# Patient Record
Sex: FEMALE | Race: WHITE | NOT HISPANIC OR LATINO | URBAN - METROPOLITAN AREA
[De-identification: names, ages, dates, MRNs, and addresses within clinical notes are randomized per-mention and may not be internally consistent; named-entity substitution may affect disease eponyms.]

---

## 2017-10-27 ENCOUNTER — ALLSCRIPTS OFFICE VISIT (OUTPATIENT)
Dept: OTHER | Facility: OTHER | Age: 27
End: 2017-10-27

## 2017-10-27 ENCOUNTER — LAB REQUISITION (OUTPATIENT)
Dept: LAB | Facility: HOSPITAL | Age: 27
End: 2017-10-27
Payer: COMMERCIAL

## 2017-10-27 DIAGNOSIS — Z13.220 ENCOUNTER FOR SCREENING FOR LIPOID DISORDERS: ICD-10-CM

## 2017-10-27 DIAGNOSIS — Z13.1 ENCOUNTER FOR SCREENING FOR DIABETES MELLITUS: ICD-10-CM

## 2017-10-27 LAB
ANION GAP SERPL CALCULATED.3IONS-SCNC: 9 MMOL/L (ref 4–13)
BUN SERPL-MCNC: 10 MG/DL (ref 5–25)
CALCIUM SERPL-MCNC: 9.2 MG/DL (ref 8.3–10.1)
CHLORIDE SERPL-SCNC: 105 MMOL/L (ref 100–108)
CHOLEST SERPL-MCNC: 137 MG/DL (ref 50–200)
CO2 SERPL-SCNC: 26 MMOL/L (ref 21–32)
CREAT SERPL-MCNC: 0.82 MG/DL (ref 0.6–1.3)
GFR SERPL CREATININE-BSD FRML MDRD: 99 ML/MIN/1.73SQ M
GLUCOSE P FAST SERPL-MCNC: 91 MG/DL (ref 65–99)
HDLC SERPL-MCNC: 71 MG/DL (ref 40–60)
LDLC SERPL CALC-MCNC: 59 MG/DL (ref 0–100)
POTASSIUM SERPL-SCNC: 4.1 MMOL/L (ref 3.5–5.3)
SODIUM SERPL-SCNC: 140 MMOL/L (ref 136–145)
TRIGL SERPL-MCNC: 37 MG/DL

## 2017-10-27 PROCEDURE — 80061 LIPID PANEL: CPT | Performed by: PHYSICIAN ASSISTANT

## 2017-10-27 PROCEDURE — 80048 BASIC METABOLIC PNL TOTAL CA: CPT | Performed by: PHYSICIAN ASSISTANT

## 2017-10-30 ENCOUNTER — GENERIC CONVERSION - ENCOUNTER (OUTPATIENT)
Dept: OTHER | Facility: OTHER | Age: 27
End: 2017-10-30

## 2018-01-11 NOTE — RESULT NOTES
Verified Results  (1) CBC/PLT/DIFF [id number] 04:08PM Jv Setter     Test Name Result Flag Reference   DIFFERENTIAL METHOD Automated     WBC COUNT 6 71 Thousand/uL  4 31-10 16   RBC COUNT 4 59 Million/uL  3 81-5 12   HEMOGLOBIN 13 1 g/dL  11 5-15 4   HEMATOCRIT 39 7 %  34 8-46  1   MCV 87 fL  82-98   MCH 28 5 pg  26 8-34 3   MCHC 33 0 g/dL  31 4-37 4   RDW 13 9 %  11 6-15 1   MPV 10 1 fL  8 9-12 7   PLATELET COUNT 205 Thousand/uL  149-390   nRBC AUTOMATED 0 /100 WBC     NEUTROPHILS RELATIVE PERCENT 61 %  43-75   LYMPHOCYTES RELATIVE PERCENT 32 %  14-44   MONOCYTES RELATIVE PERCENT 7 %  4-12   NEUTROPHILS ABSOLUTE COUNT 4 09 Thousand/uL  1 85-7 62   LYMPHOCYTES ABSOLUTE COUNT 2 15 Thousand/uL  0 60-4 47   MONOCYTES ABSOLUTE COUNT 0 47 Thousand/uL  0 17-1 22   EOSINOPHILS ABSOLUTE COUNT 0 02 Thousand/uL  0 00-0 61   BASOPHILS ABSOLUTE COUNT 0 03 Thousand/uL  0 00-0 10     (1) IRON SATURATION %, TIBC [id number] 04:08PM Jv Setter     Test Name Result Flag Reference   IRON 114 mcg/dL     Iron values may be falsely elevated in serum samples from patients  treated with anticoagulants (e g , hemodialysis patients)     TOTAL IRON BINDING CAPACITY 575 mcg/dL H 250-450   IRON SATUR % 20 %       (1) FERRITIN [id number] 04:08PM Jv Setter     Test Name Result Flag Reference   FERRITIN 7 ng/mL L 8-388   Premenopausal levels may be lower       Signatures   Electronically signed by : Mark Lara [facility]; Jan 14 2016  8:03AM EST                       (Author)

## 2018-01-11 NOTE — RESULT NOTES
Verified Results  (1) THIN PREP PAP WITH IMAGING [id number] 04:09PM Kameron Manriquez Order Number: [id number]     Test Name Result Flag Reference   LAB AP CASE REPORT (Report)     Gynecologic Cytology Report            Case: [id number]                  Authorizing Provider: Lidya Aguilar PA-C  Collected:      09/06/2016 1609        First Screen:     JENNIFER Aranda    Received:      09/09/2016 1044        Specimen:  LIQUID-BASED PAP, SCREENING, Cervix   LAB AP GYN PRIMARY INTERPRETATION      Negative for intraepithelial lesion or malignancy  Electronically signed by JENNIFER Aranda on 9/14/2016 at 10:49 AM   LAB AP GYN INTERPRETATION      Shift in clarice suggestive of bacterial vaginosis   LAB AP GYN SPECIMEN ADEQUACY      Satisfactory for evaluation  Endocervical/transformation zone component present  LAB AP GYN ADDITIONAL INFORMATION (Report)     [employer]'s FDA approved ,  and ThinPrep Imaging System are   utilized with strict adherence to the 's instruction manual to   prepare gynecologic and non-gynecologic cytology specimens for the   production of ThinPrep slides as well as for gynecologic ThinPrep imaging  These processes have been validated by our laboratory and/or by the     The Pap test is not a diagnostic procedure and should not be used as the   sole means to detect cervical cancer  It is only a screening procedure to   aid in the detection of cervical cancer and its precursors  Both   false-negative and false-positive results have been experienced  Your   patient's test result should be interpreted in this context together with   the history and clinical findings     LAB AP LMP 8/14/2016         Plan  Bacterial vaginosis    · MetroNIDAZOLE 500 MG Oral Tablet; TAKE 1 TABLET TWICE DAILY UNTIL  FINISHED

## 2018-01-12 NOTE — PROGRESS NOTES
Assessment    1  Acute bacterial sinusitis (461 9) (J01 90)    Plan  Acute bacterial sinusitis    · Cefuroxime Axetil 500 MG Oral Tablet; TAKE 1 TABLET EVERY 12 HOURS DAILY    Discussion/Summary    Patient is here for upper respiratory symptoms for the past week, no better with Sudafed and DayQuil  Based on symptoms and exam findings, suspicion for acute bacterial sinusitis  Patient to complete Ceftin antibiotic twice a day Ã10 days  I advised patient continue Sudafed  She should discontinue DayQuil and recommended she try a more specific medication for cough such as Mucinex DM  She may certainly alternate Tylenol or Motrin as needed  Rest, increase clear fluids, salt water gargles  Patient advised to call office if symptoms persist despite treatment  Possible side effects of new medications were reviewed with the patient/guardian today  The treatment plan was reviewed with the patient/guardian  The patient/guardian understands and agrees with the treatment plan      Chief Complaint  pt here with c/o non-productive cough, and nasal congestion/drainage times 1 week  Pt states she has taken Sudafed and DayQuil for symptoms  History of Present Illness  HPI: Patient is a 26y/o female here for cold sxs for past week  Reports nasal congestion, slight sinus pressure, itchy ears inside, dry throat  Also dry cough  Denies sore throat, PND or fevers  Denies chest tightness  Has tried sudafed and dayquil  Review of Systems    Constitutional: feeling poorly and feeling tired, but no fever and no chills  ENT: as noted in HPI  Cardiovascular: no complaints of slow or fast heart rate, no chest pain, no palpitations, no leg claudication or lower extremity edema  Respiratory: as noted in HPI  Gastrointestinal: no complaints of abdominal pain, no constipation, no nausea or diarrhea, no vomiting, no bloody stools  Musculoskeletal: no myalgias     Neurological: no complaints of headache, no confusion, no numbness or tingling, no dizziness or fainting  Active Problems    1  Anxiety (300 00) (F41 9)   2  History of Contraceptives (V25 02)   3  Encounter for birth control pills maintenance (V25 41) (Z30 41)   4  Encounter for routine gynecological examination (V72 31) (Z01 419)   5  Screening, anemia, deficiency, iron (V78 0) (Z13 0)    Past Medical History    1  History of Abnormal vaginal bleeding (623 8) (N93 9)   2  History of Appendicitis (541)   3  History of Lump in neck (784 2) (R22 1)   4  History of Need for prophylactic vaccination and inoculation against influenza (V04 81)   (Z23)   5  History of Screening examination for pulmonary tuberculosis (V74 1) (Z11 1)   6  History of Screening for human papillomavirus (HPV) (V73 81) (Z11 51)   7  History of Screening for malignant neoplasm of cervix (V76 2) (Z12 4)   8  History of Screening for STD (sexually transmitted disease) (V74 5) (Z11 3)    Family History    1  Family history of Breast Cancer (V16 3)    Social History    · Never A Smoker  The social history was reviewed and updated today  Surgical History    1  History of Appendectomy   2  History of Contraceptives (V25 02)   3  History of Eye Surgery    Current Meds   1  Camrese Lo 0 1-0 02 & 0 01 MG Oral Tablet; take 1 tablet daily  as directed in pack; Therapy: 27Wyv3100 to (Last Rx:23Wnz1139)  Requested for: 02Lmb2997 Ordered   2  Citalopram Hydrobromide 20 MG Oral Tablet; TAKE 1 TABLET DAILY; Therapy: 36PPH8313 to (Supa Coelho)  Requested for: 71VCL6359; Last   Rx:13Tvq0555 Ordered   3  LORazepam 0 5 MG Oral Tablet; take 1 tab BID prn anxiety; Therapy: 30BVK0748 to (Last Rx:13Avm1746) Ordered    The medication list was reviewed and updated today  Allergies    1   No Known Drug Allergies    Vitals   Recorded: 18LLX3949 02:37PM Recorded: 59RZI0505 02:35PM   Temperature  98 4 F, Tympanic   Heart Rate  80   Respiration  16   Systolic  261   Diastolic  70   Height  5 ft 4 8 in Weight  119 lb 2 08 oz   BMI Calculated  19 95   BSA Calculated  1 59   O2 Saturation  97, RA   LMP Approx 08FQS6845      Physical Exam    Constitutional   General appearance: Abnormal   acutely ill, comfortable, within normal limits of ideal weight, appears tired and appearance reflects stated age  Eyes   Conjunctiva and lids: No swelling, erythema or discharge  Ears, Nose, Mouth, and Throat   External inspection of ears and nose: Normal     Otoscopic examination: Abnormal   B/L TM's dull and bulging  Nasal mucosa, septum, and turbinates: Abnormal   There was a mucoid discharge from both nares  The bilateral nasal mucosa was boggy, edematous and red  B/L swelling and erythema  Oropharynx: Abnormal   The posterior pharynx was erythematous and PND, but did not have an exudate  The tonsils were normal    Pulmonary   Respiratory effort: Abnormal   Respiratory rate: normal  Assessment of respiratory effort revealed normal rhythm and effort  Respiratory Findings: dry cough, but no barky cough, no audible wheezing, no stridor and no mouth breathing  Auscultation of lungs: Clear to auscultation  Cardiovascular   Auscultation of heart: Normal rate and rhythm, normal S1 and S2, without murmurs  Lymphatic   Palpation of lymph nodes in neck: No lymphadenopathy           Signatures   Electronically signed by : Bridger Osorio, [facility]; Feb  3 2016  3:21PM EST                       (Author)    Electronically signed by : Alex Dudley DO; Feb 4 2016  6:45AM EST                       (Co-author)

## 2018-01-16 NOTE — RESULT NOTES
Verified Results  (1) BASIC METABOLIC PROFILE 56WXW0125 03:24PM Lazo Octave     Test Name Result Flag Reference   SODIUM 140 mmol/L  136-145   POTASSIUM 4 1 mmol/L  3 5-5 3   CHLORIDE 105 mmol/L  100-108   CARBON DIOXIDE 26 mmol/L  21-32   ANION GAP (CALC) 9 mmol/L  4-13   BLOOD UREA NITROGEN 10 mg/dL  5-25   CREATININE 0 82 mg/dL  0 60-1 30   Standardized to IDMS reference method   CALCIUM 9 2 mg/dL  8 3-10 1   eGFR 99 ml/min/1 73sq m     National Kidney Disease Education Program recommendations are as follows:  GFR calculation is accurate only with a steady state creatinine  Chronic Kidney disease less than 60 ml/min/1 73 sq  meters  Kidney failure less than 15 ml/min/1 73 sq  meters  GLUCOSE FASTING 91 mg/dL  65-99   Specimen collection should occur prior to Sulfasalazine administration due to the potential for falsely depressed results  Specimen collection should occur prior to Sulfapyridine administration due to the potential for falsely elevated results  (1) LIPID PANEL FASTING W DIRECT LDL REFLEX 83Hlh7868 03:24PM [facility]     Test Name Result Flag Reference   CHOLESTEROL 137 mg/dL     LDL CHOLESTEROL CALCULATED 59 mg/dL  0-100   This is a patient instruction: This is a fasting test  Water, black tea or black coffee only after 9:00pm the night before the test  Drink 2 glasses of water the morning of the test       Triglyceride:        Normal <150 mg/dl   Borderline High 150-199 mg/dl   High 200-499 mg/dl   Very High >499 mg/dl      Cholesterol:       Desirable <200 mg/dl    Borderline High 200-239 mg/dl    High >239 mg/dl      HDL Cholesterol:       High>59 mg/dL    Low <41 mg/dL      HDL Cholesterol:       High>59 mg/dL    Low <41 mg/dL      This screening LDL is a calculated result  It does not have the accuracy of the Direct Measured LDL in the monitoring of patients with hyperlipidemia and/or statin therapy     Direct Measure LDL (MCM522) must be ordered separately in these patients  TRIGLYCERIDES 37 mg/dL  <=150   Specimen collection should occur prior to N-Acetylcysteine or Metamizole administration due to the potential for falsely depressed results  HDL,DIRECT 71 mg/dL H 40-60   Specimen collection should occur prior to Metamizole administration due to the potential for falsley depressed results

## 2018-01-16 NOTE — PROGRESS NOTES
Assessment    1  Encounter for preventive health examination (V70 0) (Z00 00)   2  Encounter for screening for diabetes mellitus (V77 1) (Z13 1)   3  Encounter for lipid screening for cardiovascular disease (V77 91,V81 2) (Z13 220,Z13 6)   4  Insomnia, unspecified type (780 52) (G47 00)   5  Influenza vaccine administered (V04 81) (Z23)    Plan  Encounter for lipid screening for cardiovascular disease, Encounter for screening for  diabetes mellitus    · (1) LIPID PANEL FASTING W DIRECT LDL REFLEX; Status:Active; Requested  [facility]:73AAW4808;    · Routine Venipuncture - POC; Status:Complete;   Done: 68DZM4779  Encounter for screening for diabetes mellitus    · (1) BASIC METABOLIC PROFILE; Status:Active; Requested [facility]:[id number]; Influenza vaccine administered    · Fluzone High-Dose 0 5 ML Intramuscular Suspension Prefilled Syringe;  INJECT 0 5  ML Intramuscular; To Be Done: 27Oct2017   · Fluzone Quadrivalent Intramuscular Suspension  Insomnia, unspecified type    · Zolpidem Tartrate 5 MG Oral Tablet; TAKE 1 TABLET AT BEDTIME    Discussion/Summary  health maintenance visit Currently, she eats a healthy diet and has an inadequate exercise regimen  cervical cancer screening is current due again sept 2019 Testing was done today for no risk  Breast cancer screening: the risks and benefits of breast cancer screening were discussed and breast cancer screening is not indicated  Colorectal cancer screening: colorectal cancer screening is not indicated  The flu vaccine updated today  She was advised to be evaluated by an ophthalmologist and a dentist  Advice and education were given regarding nutrition, aerobic exercise, reproductive health and cardiovascular risk reduction  Patient having some issues with insomnia the past few weeks to a month, often waking in the middle of the night worried about missing her alarm  There is no other attributable triggers  No change to her sleep cycle   I will try her on a short course of Ambien for 1-2 weeks at 5 mg with printed prescription provided before leaving today to see if we can get her into a better sleeping regimen  She can stop after 1-2 weeks and see how she does  If she needs the Ambien longer than a month I would prefer she follow-up to discuss in further detail as I would prefer to avoid using a non dependent medication  Possible side effects of new medications were reviewed with the patient/guardian today  The treatment plan was reviewed with the patient/guardian  The patient/guardian understands and agrees with the treatment plan      Chief Complaint  Pt presents for an insurance physical  Pt would like flu shot today  Pt needs FBW  History of Present Illness  HM, Adult Female: The patient is being seen for a health maintenance evaluation  General Health: The patient's health since the last visit is described as good   no changes to health, no recent illness or hospitalizations  She has regular dental visits  The patient brushes 2 time(s) a day  She complains of vision problems  Vision care includes wearing glasses, wearing soft contact lenses and last eye exam: aug 2017  She denies hearing loss  Immunizations status: not up to date  Lifestyle:  She consumes a diverse and healthy diet  She does not have any weight concerns  She does not exercise regularly  Exercise includes aerobic conditioning and running/jogging  She does not use tobacco  She consumes alcohol  (rare ETOH)   She denies drug use  Reproductive health:  she reports normal menses (pt had nexplanon in march but had it taken out about 2 months ago, periods cycling within every month)  Menstrual history: LMP: the last menstrual period was 9/2017  The cycles have been regular  The duration of her recent periods has been regular  she is sexually active  She is monogamous with a male partner  pregnancy history: G 1P 1, 0  Screening: Cervical cancer screening includes a pap smear performed 2016   Breast cancer screening includes no previous mammogram  Colorectal cancer screening includes no previous colonoscopy  Review of Systems    Constitutional: No fever, no chills, feels well, no tiredness, no recent weight gain or weight loss  Eyes: No complaints of eye pain, no red eyes, no eyesight problems, no discharge, no dry eyes, no itching of eyes  ENT: no complaints of earache, no loss of hearing, no nose bleeds, no nasal discharge, no sore throat, no hoarseness  Cardiovascular: No complaints of slow heart rate, no fast heart rate, no chest pain, no palpitations, no leg claudication, no lower extremity edema  Respiratory: No complaints of shortness of breath, no wheezing, no cough, no SOB on exertion, no orthopnea, no PND  Gastrointestinal: No complaints of abdominal pain, no constipation, no nausea or vomiting, no diarrhea, no bloody stools  Genitourinary: No complaints of dysuria, no incontinence, no pelvic pain, no dysmenorrhea, no vaginal discharge or bleeding  Musculoskeletal: No complaints of arthralgias, no myalgias, no joint swelling or stiffness, no limb pain or swelling  Integumentary: No complaints of skin rash or lesions, no itching, no skin wounds, no breast pain or lump  Neurological: No complaints of headache, no confusion, no convulsions, no numbness, no dizziness or fainting, no tingling, no limb weakness, no difficulty walking  Psychiatric: sleep disturbances  Endocrine: No complaints of proptosis, no hot flashes, no muscle weakness, no deepening of the voice, no feelings of weakness  Hematologic/Lymphatic: No complaints of swollen glands, no swollen glands in the neck, does not bleed easily, does not bruise easily  Active Problems    1   Anxiety (300 00) (F41 9)    Past Medical History    · History of Abnormal vaginal bleeding (623 8) (N93 9)   · History of Appendicitis (541)   · History of Bacterial vaginosis (616 10,041 9) (N76 0,B96 89)   · History of Encounter for birth control pills maintenance (V25 41) (Z30 41)   · History of Encounter for gynecological examination with Papanicolaou smear of cervix  (V72 31) (Z01 419)   · History of Encounter for routine gynecological examination (V72 31) (Z01 419)   · History of acute bacterial sinusitis (V12 69) (Z87 09)   · History of vaginal discharge (V13 29) (Z87 42)   · History of Lump in neck (784 2) (R22 1)   · History of Need for prophylactic vaccination and inoculation against influenza (V04 81)  (Z23)   · History of Screening examination for pulmonary tuberculosis (V74 1) (Z11 1)   · History of Screening for depression (V79 0) (Z13 89)   · History of Screening for human papillomavirus (HPV) (V73 81) (Z11 51)   · History of Screening for malignant neoplasm of cervix (V76 2) (Z12 4)   · History of Screening for STD (sexually transmitted disease) (V74 5) (Z11 3)   · History of Screening, anemia, deficiency, iron (V78 0) (Z13 0)   · History of Skin rash (782 1) (R21)    Surgical History    · History of Appendectomy   · History of Contraceptives (V25 02)   · History of Eye Surgery    Family History  Father    · Family history of obesity (V18 19) (Z83 49)  Grandparent    · Family history of Alzheimer's disease (V17 2) (Z82 0)   · Family history of lung cancer (V16 1) (Z80 1)  Maternal Grandmother    · Family history of Breast Cancer (V16 3)    Social History    · Never A Smoker   · Occasional alcohol use    Current Meds   1  LORazepam 0 5 MG Oral Tablet; take 1 tab BID prn anxiety; Therapy: 62NTH9295 to (Last Rx:49Adg0844)  Requested for: 56Ijo5077 Ordered    Allergies    1   No Known Drug Allergies    Vitals   Recorded: 93YQP4266 08:34AM   Temperature 96 9 F, Tympanic   Heart Rate 74   Pulse Quality Normal   Respiration Quality Normal   Respiration 14   Systolic 123, LUE, Sitting   Diastolic 70, LUE, Sitting   Height 5 ft 4 in   Weight 102 lb 8 oz   BMI Calculated 17 59   BSA Calculated 1 47   O2 Saturation 99   LMP 96Fuh5833 Physical Exam    Constitutional   General appearance: No acute distress, well appearing and well nourished  appears healthy, comfortable, within normal limits of ideal weight and appearance reflects stated age  vitals reviewed  Head and Face   Head and face: Normal     Eyes   Conjunctiva and lids: No swelling, erythema or discharge  Pupils and irises: Equal, round, reactive to light  EOMI, PERRLA B/L  Ears, Nose, Mouth, and Throat   External inspection of ears and nose: Normal     Otoscopic examination: Tympanic membranes translucent with normal light reflex  Canals patent without erythema  Hearing: Normal   grossly normal B/L at 10ft  Nasal mucosa, septum, and turbinates: Normal without edema or erythema  Lips, teeth, and gums: Normal, good dentition  Oropharynx: Normal with no erythema, edema, exudate or lesions  Neck   Thyroid: Normal, no thyromegaly  Pulmonary   Respiratory effort: No increased work of breathing or signs of respiratory distress  Auscultation of lungs: Clear to auscultation  Cardiovascular   Auscultation of heart: Normal rate and rhythm, normal S1 and S2, no murmurs  Carotid pulses: 2+ bilaterally  right 2+, no bruit heard over the right carotid, left 2+ and no bruit heard over the left carotid  Pedal pulses: 2+ bilaterally  Examination of extremities for edema and/or varicosities: Normal     Abdomen   Abdomen: Non-tender, no masses  The abdomen was flat  Bowel sounds were normal  The abdomen was soft and nontender  Lymphatic   Palpation of lymph nodes in neck: No lymphadenopathy  Musculoskeletal   Gait and station: Normal     Digits and nails: Normal without clubbing or cyanosis  Joints, bones, and muscles: Normal     Stability: Normal     Skin   Skin and subcutaneous tissue: Normal without rashes or lesions  Neurologic   Cranial nerves: Cranial nerves II-XII intact  Reflexes: 2+ and symmetric    Deep tendon reflexes: 1+ right brachioradialis, 1+ left brachioradialis, 1+ right patella and 1+ left patella  Coordination: Normal finger to nose and heel to shin  Psychiatric   Judgment and insight: Normal     Orientation to person, place, and time: Normal     Mood and affect: Normal        Results/Data  PHQ-9 Adult Depression Screening 27Oct2017 08:39AM User, s     Test Name Result Flag Reference   PHQ-9 Adult Depression Score 6     Over the last two weeks, how often have you been bothered by any of the following problems? Little interest or pleasure in doing things: Not at all - 0  Feeling down, depressed, or hopeless: Several days - 1  Trouble falling or staying asleep, or sleeping too much: More than half the days - 2  Feeling tired or having little energy: More than half the days - 2  Poor appetite or over eating: Not at all - 0  Feeling bad about yourself - or that you are a failure or have let yourself or your family down: Not at all - 0  Trouble concentrating on things, such as reading the newspaper or watching television: Several days - 1  Moving or speaking so slowly that other people could have noticed  Or the opposite -  being so fidgety or restless that you have been moving around a lot more than usual: Not at all - 0  Thoughts that you would be better off dead, or of hurting yourself in some way: Not at all - 0   PHQ-9 Adult Depression Screening Negative     PHQ-9 Difficulty Level Somewhat difficult     PHQ-9 Severity Mild Depression         Health Management  History of Screening for STD (sexually transmitted disease)   (1) CHLAMYDIA/GC AMPLIFIED DNA, PCR; every 1 year; Next Due: 93QAC1529; Overdue    Signatures   Electronically signed by : Isaiah Romo, [facility]; Oct 27 2017  9:08AM EST                       (Author)    Electronically signed by :  Marisol York DO; Oct 27 2017 12:32PM EST                       (Author)

## 2018-01-22 VITALS
HEART RATE: 74 BPM | RESPIRATION RATE: 14 BRPM | WEIGHT: 102.5 LBS | TEMPERATURE: 96.9 F | HEIGHT: 64 IN | DIASTOLIC BLOOD PRESSURE: 70 MMHG | BODY MASS INDEX: 17.5 KG/M2 | SYSTOLIC BLOOD PRESSURE: 108 MMHG | OXYGEN SATURATION: 99 %

## 2018-04-22 RX ORDER — ZOLPIDEM TARTRATE 5 MG/1
TABLET ORAL
Qty: 30 TABLET | Refills: 0 | OUTPATIENT
Start: 2018-04-22